# Patient Record
Sex: FEMALE | Race: WHITE | Employment: OTHER | URBAN - METROPOLITAN AREA
[De-identification: names, ages, dates, MRNs, and addresses within clinical notes are randomized per-mention and may not be internally consistent; named-entity substitution may affect disease eponyms.]

---

## 2017-01-02 ENCOUNTER — HOSPITAL ENCOUNTER (OUTPATIENT)
Dept: CT IMAGING | Facility: HOSPITAL | Age: 82
Discharge: HOME | End: 2017-01-02
Attending: INTERNAL MEDICINE
Payer: MEDICARE

## 2017-01-02 DIAGNOSIS — E61.1 IRON DEFICIENCY: ICD-10-CM

## 2017-01-02 DIAGNOSIS — C18.3 MALIGNANT NEOPLASM OF HEPATIC FLEXURE (HCC): ICD-10-CM

## 2017-01-02 DIAGNOSIS — R91.1 PULMONARY NODULE: ICD-10-CM

## 2017-01-02 PROCEDURE — 74177 CT ABD & PELVIS W/CONTRAST: CPT

## 2017-01-02 PROCEDURE — 71260 CT THORAX DX C+: CPT

## 2017-01-23 ENCOUNTER — SURGERY (OUTPATIENT)
Age: 82
End: 2017-01-23

## 2017-01-23 ENCOUNTER — APPOINTMENT (OUTPATIENT)
Dept: GENERAL RADIOLOGY | Facility: HOSPITAL | Age: 82
End: 2017-01-23
Attending: ANESTHESIOLOGY
Payer: MEDICARE

## 2017-01-24 ENCOUNTER — TELEPHONE (OUTPATIENT)
Dept: NEUROLOGY | Facility: CLINIC | Age: 82
End: 2017-01-24

## 2017-01-24 NOTE — TELEPHONE ENCOUNTER
Spoke with pt who had questions in regards to steroid injections side effects. Pt reports face is red since cervical steroid injection yesterday 1/23/2017. No other side effects reported by pt. Questions answered, no further questions at this time.

## 2017-01-30 ENCOUNTER — APPOINTMENT (OUTPATIENT)
Dept: GENERAL RADIOLOGY | Facility: HOSPITAL | Age: 82
End: 2017-01-30
Attending: ANESTHESIOLOGY
Payer: MEDICARE

## 2017-01-30 ENCOUNTER — SURGERY (OUTPATIENT)
Age: 82
End: 2017-01-30

## 2017-02-06 ENCOUNTER — SURGERY (OUTPATIENT)
Age: 82
End: 2017-02-06

## 2017-02-20 PROBLEM — R11.2 NAUSEA & VOMITING: Status: ACTIVE | Noted: 2017-02-20

## 2017-02-20 PROCEDURE — 82378 CARCINOEMBRYONIC ANTIGEN: CPT | Performed by: PHYSICIAN ASSISTANT

## 2017-03-01 ENCOUNTER — HOSPITAL (OUTPATIENT)
Dept: OTHER | Age: 82
End: 2017-03-01
Attending: EMERGENCY MEDICINE

## 2017-03-20 ENCOUNTER — TELEPHONE (OUTPATIENT)
Dept: SURGERY | Facility: CLINIC | Age: 82
End: 2017-03-20

## 2017-03-20 NOTE — TELEPHONE ENCOUNTER
Spoke to patient, advised AB out of office on 3/27, appointment will need to be rescheduled. Patient rescheduled to see Cherre Child APN on Friday 3/31, patient verbalized understanding, no further needs at this time.

## 2017-03-25 ENCOUNTER — HOSPITAL (OUTPATIENT)
Dept: OTHER | Age: 82
End: 2017-03-25
Attending: SURGERY

## 2017-03-26 ENCOUNTER — CHARTING TRANS (OUTPATIENT)
Dept: OTHER | Age: 82
End: 2017-03-26

## 2017-03-31 ENCOUNTER — OFFICE VISIT (OUTPATIENT)
Dept: SURGERY | Facility: CLINIC | Age: 82
End: 2017-03-31

## 2017-03-31 VITALS
SYSTOLIC BLOOD PRESSURE: 110 MMHG | RESPIRATION RATE: 18 BRPM | HEIGHT: 64 IN | BODY MASS INDEX: 26.29 KG/M2 | WEIGHT: 154 LBS | DIASTOLIC BLOOD PRESSURE: 64 MMHG | HEART RATE: 85 BPM

## 2017-03-31 DIAGNOSIS — M25.511 CHRONIC RIGHT SHOULDER PAIN: ICD-10-CM

## 2017-03-31 DIAGNOSIS — M50.30 DEGENERATIVE DISC DISEASE, CERVICAL: ICD-10-CM

## 2017-03-31 DIAGNOSIS — G89.29 CHRONIC RIGHT SHOULDER PAIN: ICD-10-CM

## 2017-03-31 DIAGNOSIS — G56.01 CARPAL TUNNEL SYNDROME ON RIGHT: ICD-10-CM

## 2017-03-31 DIAGNOSIS — M54.81 BILATERAL OCCIPITAL NEURALGIA: Primary | ICD-10-CM

## 2017-03-31 DIAGNOSIS — M47.812 FACET ARTHROPATHY, CERVICAL: ICD-10-CM

## 2017-03-31 PROCEDURE — 99213 OFFICE O/P EST LOW 20 MIN: CPT | Performed by: NURSE PRACTITIONER

## 2017-03-31 NOTE — PATIENT INSTRUCTIONS
Refill policies:    • Allow 2 business days for refills; controlled substances may take longer.   • Contact your pharmacy at least 5 days prior to running out of medication and have them send an electronic request or submit request through the “request re insurance carrier to obtain pre-certification or prior authorization. Unfortunately, [facility] has seen an increase in denial of payment even though the procedure/test has been pre-certified.   You are strongly encouraged to contact your insurance carrier to v

## 2017-03-31 NOTE — PROGRESS NOTES
Name: Aly Schmitt   : 1935   DOS: 3/31/2017     Patient presents with: Other: f/u,  pain in bilateral arms    HPI  Pain Clinic Follow Up Visit:   Aly Schmitt is a 80year old female who presents for recheck of Chronic pain after;    Last Unspecified vitamin D deficiency 5/23/2011   • Lymphedema of arm 5/23/2011   • Cheekbone fracture ([facility] [address] 5/23/2011   • Thyroid nodule 5/23/2011     partial thyroidectomy   • Colon polyp      Dr Jami Murray   • Osteopenia 6/23/2011   • GERD 2011   • Degeneration Oral Tab Take 1 tablet (10 mg total) by mouth once daily. Disp: 90 tablet Rfl: 0   aspirin 81 MG Oral Tab Take 81 mg by mouth daily. Disp:  Rfl:    Cyanocobalamin (VITAMIN B-12 OR) Take  by mouth.  Disp:  Rfl:          EXAM:   /64 mmHg  Pulse 85  Resp and capsulitis. The acromion is type II with narrowing of the subacromial arch measuring approximately 4-5 mm. Associated mild subacromial/subdeltoid bursitis noted.   BICEPS/LABRAL COMPLEX:  The long head of the biceps tendon is intact without significant narrowing of the subacromial arch, also progressed from the prior exam. Please correlate clinically for subacromial impingement.              Dictated by:  Jed Song DO on 11/12/2015 at 9:38        ASSESSMENT and PLAN:   Chronic pain that is resolved to

## 2017-05-25 RX ORDER — MELATONIN
1000
COMMUNITY
End: 2019-01-01

## 2017-05-25 RX ORDER — DOXEPIN HYDROCHLORIDE 50 MG/1
1 CAPSULE ORAL DAILY
COMMUNITY
End: 2017-09-01

## 2017-06-13 ENCOUNTER — HOSPITAL ENCOUNTER (OUTPATIENT)
Facility: HOSPITAL | Age: 82
Setting detail: HOSPITAL OUTPATIENT SURGERY
Discharge: HOME | End: 2017-06-13
Attending: INTERNAL MEDICINE | Admitting: INTERNAL MEDICINE
Payer: MEDICARE

## 2017-06-13 ENCOUNTER — SURGERY (OUTPATIENT)
Age: 82
End: 2017-06-13

## 2017-06-13 VITALS
SYSTOLIC BLOOD PRESSURE: 110 MMHG | DIASTOLIC BLOOD PRESSURE: 60 MMHG | WEIGHT: 150 LBS | HEIGHT: 66 IN | RESPIRATION RATE: 15 BRPM | TEMPERATURE: 98 F | HEART RATE: 79 BPM | OXYGEN SATURATION: 95 % | BODY MASS INDEX: 24.11 KG/M2

## 2017-06-13 DIAGNOSIS — Z12.11 SPECIAL SCREENING FOR MALIGNANT NEOPLASMS, COLON: ICD-10-CM

## 2017-06-13 PROCEDURE — 0DJD8ZZ INSPECTION OF LOWER INTESTINAL TRACT, VIA NATURAL OR ARTIFICIAL OPENING ENDOSCOPIC: ICD-10-PCS | Performed by: INTERNAL MEDICINE

## 2017-06-13 RX ORDER — MIDAZOLAM HYDROCHLORIDE 1 MG/ML
INJECTION INTRAMUSCULAR; INTRAVENOUS
Status: DISCONTINUED | OUTPATIENT
Start: 2017-06-13 | End: 2017-06-13

## 2017-06-13 RX ORDER — SODIUM CHLORIDE, SODIUM LACTATE, POTASSIUM CHLORIDE, CALCIUM CHLORIDE 600; 310; 30; 20 MG/100ML; MG/100ML; MG/100ML; MG/100ML
INJECTION, SOLUTION INTRAVENOUS CONTINUOUS
Status: DISCONTINUED | OUTPATIENT
Start: 2017-06-13 | End: 2017-06-13

## 2017-06-13 NOTE — OPERATIVE REPORT
[facility],4Th Floor Patient Status:  Hospital Outpatient Surgery    1935 MRN [id number]   [facility] ENDOSCOPY Attending Wanda Valdes MD   Hosp Day # 0 PCP Audie Maxwell MD         PATIENT NAME: Octavio Del Valle

## 2017-06-13 NOTE — H&P
[facility] GASTROENTEROLOGY    REFERRING PHYSICIAN: Dr. Noemi Julian is a 80year old female. CRC screening, Hx colon cancer    See note reviewed from 5/5/17    PROCEDURE: Colonoscopy    Allergies: Codeine; Norco; Amoxicillin;  Marta Corti deirdre at [address] [place] [address] GI ENDOSCOPY,BIOPSY  2/18/16= Normal, mild duodenal villous blunting, positive celiac antibodies    COLONOSCOPY,BIOPSY  2/18//16= Diverticulosis, Colon cancer    Comment Repeat 2017    MASTECTOMY

## 2017-09-01 PROCEDURE — 82378 CARCINOEMBRYONIC ANTIGEN: CPT | Performed by: INTERNAL MEDICINE

## 2017-09-01 PROCEDURE — 82607 VITAMIN B-12: CPT | Performed by: INTERNAL MEDICINE

## 2017-10-12 PROBLEM — Z91.81 AT RISK FOR FALLING: Status: ACTIVE | Noted: 2017-10-12

## 2017-10-12 PROBLEM — C77.2 METASTASIS TO MESENTERIC LYMPH NODE (HCC): Status: ACTIVE | Noted: 2017-10-12

## 2017-10-12 PROBLEM — Z71.9 HEALTH EDUCATION/COUNSELING: Status: ACTIVE | Noted: 2017-10-12

## 2017-10-13 ENCOUNTER — TELEPHONE (OUTPATIENT)
Dept: LAB | Age: 82
End: 2017-10-13

## 2017-10-13 NOTE — TELEPHONE ENCOUNTER
Patient called requesting her home health be cancelled as soon as possible stated: \"theres no sense in 2 people coming to do therapy on me one for my hand and the other for my leg, I walk around the block all the time, they are just taking my money\"  I t

## 2017-11-14 ENCOUNTER — DIAGNOSTIC TRANS (OUTPATIENT)
Dept: OTHER | Age: 82
End: 2017-11-14

## 2017-11-14 ENCOUNTER — HOSPITAL (OUTPATIENT)
Dept: OTHER | Age: 82
End: 2017-11-14
Attending: HOSPITALIST

## 2017-11-14 LAB
ANALYZER ANC (IANC): ABNORMAL
ANION GAP SERPL CALC-SCNC: 18 MMOL/L (ref 10–20)
BASOPHILS # BLD: 0 THOUSAND/MCL (ref 0–0.3)
BASOPHILS NFR BLD: 0 %
BUN SERPL-MCNC: 15 MG/DL (ref 6–20)
BUN/CREAT SERPL: 31 (ref 7–25)
CALCIUM SERPL-MCNC: 9 MG/DL (ref 8.4–10.2)
CHLORIDE: 101 MMOL/L (ref 98–107)
CO2 SERPL-SCNC: 22 MMOL/L (ref 21–32)
CREAT SERPL-MCNC: 0.48 MG/DL (ref 0.51–0.95)
DIFFERENTIAL METHOD BLD: ABNORMAL
EOSINOPHIL # BLD: 0 THOUSAND/MCL (ref 0.1–0.5)
EOSINOPHIL NFR BLD: 0 %
ERYTHROCYTE [DISTWIDTH] IN BLOOD: 12.9 % (ref 11–15)
GLUCOSE BLDC GLUCOMTR-MCNC: 167 MG/DL (ref 65–99)
GLUCOSE SERPL-MCNC: 179 MG/DL (ref 65–99)
HEMATOCRIT: 37.9 % (ref 36–46.5)
HGB BLD-MCNC: 12.9 GM/DL (ref 12–15.5)
LACTATE BLDV-MCNC: 2.8 MMOL/L
LACTATE BLDV-MCNC: 4.1 MMOL/L
LACTATE BLDV-SCNC: 1.9 MMOL/L (ref 0–2)
LACTATE BLDV-SCNC: 3 MMOL/L (ref 0–2)
LYMPHOCYTES # BLD: 0.9 THOUSAND/MCL (ref 1–4)
LYMPHOCYTES NFR BLD: 10 %
MCH RBC QN AUTO: 31.1 PG (ref 26–34)
MCHC RBC AUTO-ENTMCNC: 34 GM/DL (ref 32–36.5)
MCV RBC AUTO: 91.3 FL (ref 78–100)
MONOCYTES # BLD: 0.4 THOUSAND/MCL (ref 0.3–0.9)
MONOCYTES NFR BLD: 4 %
NEUTROPHILS # BLD: 7.4 THOUSAND/MCL (ref 1.8–7.7)
NEUTROPHILS NFR BLD: 86 %
NEUTS SEG NFR BLD: ABNORMAL %
PERCENT NRBC: ABNORMAL
PLATELET # BLD: 210 THOUSAND/MCL (ref 140–450)
POTASSIUM SERPL-SCNC: 3.8 MMOL/L (ref 3.4–5.1)
PROCALCITONIN SERPL IA-MCNC: <0.05 NG/ML
PROLACTIN SERPL-MCNC: 46.2 NG/ML (ref 2.8–29.2)
RBC # BLD: 4.15 MILLION/MCL (ref 4–5.2)
SODIUM SERPL-SCNC: 137 MMOL/L (ref 135–145)
WBC # BLD: 8.7 THOUSAND/MCL (ref 4.2–11)

## 2017-11-15 LAB
CK SERPL-CCNC: 150 UNIT/L (ref 26–192)
LACTATE BLDV-SCNC: 1.7 MMOL/L (ref 0–2)
LACTATE BLDV-SCNC: 2 MMOL/L (ref 0–2)
LACTATE BLDV-SCNC: 2.2 MMOL/L (ref 0–2)

## 2017-11-24 PROBLEM — R42 VERTIGO: Status: ACTIVE | Noted: 2017-11-24

## 2017-11-24 PROBLEM — Z87.898 HISTORY OF SEIZURE: Status: ACTIVE | Noted: 2017-11-24

## 2017-11-27 PROBLEM — R41.3 MEMORY IMPAIRMENT: Status: ACTIVE | Noted: 2017-11-27

## 2017-11-29 ENCOUNTER — DIAGNOSTIC TRANS (OUTPATIENT)
Dept: OTHER | Age: 82
End: 2017-11-29

## 2017-11-29 ENCOUNTER — CHARTING TRANS (OUTPATIENT)
Dept: OTHER | Age: 82
End: 2017-11-29

## 2017-11-29 ENCOUNTER — HOSPITAL (OUTPATIENT)
Dept: OTHER | Age: 82
End: 2017-11-29
Attending: HOSPITALIST

## 2017-11-29 PROBLEM — R07.89 CHEST PRESSURE: Status: ACTIVE | Noted: 2017-11-29

## 2017-11-29 PROBLEM — R06.02 SHORTNESS OF BREATH: Status: ACTIVE | Noted: 2017-11-29

## 2017-11-29 LAB
ALBUMIN SERPL-MCNC: 3.5 GM/DL (ref 3.6–5.1)
ALBUMIN/GLOB SERPL: 1.1 {RATIO} (ref 1–2.4)
ALP SERPL-CCNC: 67 UNIT/L (ref 45–117)
ALT SERPL-CCNC: 79 UNIT/L
ANALYZER ANC (IANC): ABNORMAL
ANION GAP SERPL CALC-SCNC: 11 MMOL/L (ref 10–20)
AST SERPL-CCNC: 48 UNIT/L
BASOPHILS # BLD: 0 THOUSAND/MCL (ref 0–0.3)
BASOPHILS NFR BLD: 0 %
BILIRUB SERPL-MCNC: 0.5 MG/DL (ref 0.2–1)
BUN SERPL-MCNC: 15 MG/DL (ref 6–20)
BUN/CREAT SERPL: 31 (ref 7–25)
CALCIUM SERPL-MCNC: 8.3 MG/DL (ref 8.4–10.2)
CHLORIDE: 101 MMOL/L (ref 98–107)
CO2 SERPL-SCNC: 26 MMOL/L (ref 21–32)
CREAT SERPL-MCNC: 0.48 MG/DL (ref 0.51–0.95)
DIFFERENTIAL METHOD BLD: ABNORMAL
EOSINOPHIL # BLD: 0 THOUSAND/MCL (ref 0.1–0.5)
EOSINOPHIL NFR BLD: 0 %
ERYTHROCYTE [DISTWIDTH] IN BLOOD: 14 % (ref 11–15)
GLOBULIN SER-MCNC: 3.2 GM/DL (ref 2–4)
GLUCOSE SERPL-MCNC: 139 MG/DL (ref 65–99)
HEMATOCRIT: 35.1 % (ref 36–46.5)
HGB BLD-MCNC: 12.2 GM/DL (ref 12–15.5)
LYMPHOCYTES # BLD: 0.8 THOUSAND/MCL (ref 1–4)
LYMPHOCYTES NFR BLD: 7 %
MCH RBC QN AUTO: 31.5 PG (ref 26–34)
MCHC RBC AUTO-ENTMCNC: 34.8 GM/DL (ref 32–36.5)
MCV RBC AUTO: 90.7 FL (ref 78–100)
MONOCYTES # BLD: 0.8 THOUSAND/MCL (ref 0.3–0.9)
MONOCYTES NFR BLD: 8 %
NEUTROPHILS # BLD: 8.7 THOUSAND/MCL (ref 1.8–7.7)
NEUTROPHILS NFR BLD: 85 %
NEUTS SEG NFR BLD: ABNORMAL %
PERCENT NRBC: ABNORMAL
PLATELET # BLD: 194 THOUSAND/MCL (ref 140–450)
POTASSIUM SERPL-SCNC: 4.1 MMOL/L (ref 3.4–5.1)
PROT SERPL-MCNC: 6.7 GM/DL (ref 6.4–8.2)
RBC # BLD: 3.87 MILLION/MCL (ref 4–5.2)
SODIUM SERPL-SCNC: 134 MMOL/L (ref 135–145)
TROPONIN I SERPL HS-MCNC: <0.02 NG/ML
TROPONIN I SERPL HS-MCNC: <0.02 NG/ML
WBC # BLD: 10.3 THOUSAND/MCL (ref 4.2–11)

## 2017-11-30 PROBLEM — T50.905A ADVERSE DRUG EFFECT: Status: ACTIVE | Noted: 2017-11-30

## 2017-11-30 LAB
ALBUMIN SERPL-MCNC: 3.2 GM/DL (ref 3.6–5.1)
ALP SERPL-CCNC: 61 UNIT/L (ref 45–117)
ALT SERPL-CCNC: 71 UNIT/L
ANALYZER ANC (IANC): ABNORMAL
ANION GAP SERPL CALC-SCNC: 13 MMOL/L (ref 10–20)
AST SERPL-CCNC: 36 UNIT/L
BASOPHILS # BLD: 0 THOUSAND/MCL (ref 0–0.3)
BASOPHILS NFR BLD: 0 %
BILIRUB CONJ SERPL-MCNC: 0.1 MG/DL (ref 0–0.2)
BILIRUB SERPL-MCNC: 0.7 MG/DL (ref 0.2–1)
BUN SERPL-MCNC: 16 MG/DL (ref 6–20)
BUN/CREAT SERPL: 33 (ref 7–25)
CALCIUM SERPL-MCNC: 8.7 MG/DL (ref 8.4–10.2)
CHLORIDE: 105 MMOL/L (ref 98–107)
CO2 SERPL-SCNC: 23 MMOL/L (ref 21–32)
CREAT SERPL-MCNC: 0.49 MG/DL (ref 0.51–0.95)
DIFFERENTIAL METHOD BLD: ABNORMAL
EOSINOPHIL # BLD: 0 THOUSAND/MCL (ref 0.1–0.5)
EOSINOPHIL NFR BLD: 0 %
ERYTHROCYTE [DISTWIDTH] IN BLOOD: 14.1 % (ref 11–15)
GLUCOSE SERPL-MCNC: 131 MG/DL (ref 65–99)
HEMATOCRIT: 36.3 % (ref 36–46.5)
HGB BLD-MCNC: 12.4 GM/DL (ref 12–15.5)
LYMPHOCYTES # BLD: 1 THOUSAND/MCL (ref 1–4)
LYMPHOCYTES NFR BLD: 14 %
MCH RBC QN AUTO: 31 PG (ref 26–34)
MCHC RBC AUTO-ENTMCNC: 34.2 GM/DL (ref 32–36.5)
MCV RBC AUTO: 90.8 FL (ref 78–100)
MONOCYTES # BLD: 0.6 THOUSAND/MCL (ref 0.3–0.9)
MONOCYTES NFR BLD: 9 %
NEUTROPHILS # BLD: 5.4 THOUSAND/MCL (ref 1.8–7.7)
NEUTROPHILS NFR BLD: 77 %
NEUTS SEG NFR BLD: ABNORMAL %
PERCENT NRBC: ABNORMAL
PLATELET # BLD: 178 THOUSAND/MCL (ref 140–450)
POTASSIUM SERPL-SCNC: 4.3 MMOL/L (ref 3.4–5.1)
PROT SERPL-MCNC: 6.2 GM/DL (ref 6.4–8.2)
RBC # BLD: 4 MILLION/MCL (ref 4–5.2)
SODIUM SERPL-SCNC: 137 MMOL/L (ref 135–145)
TROPONIN I SERPL HS-MCNC: <0.02 NG/ML
TSH SERPL-ACNC: 0.44 MCUNIT/ML (ref 0.35–5)
WBC # BLD: 7 THOUSAND/MCL (ref 4.2–11)

## 2017-12-01 LAB
ANION GAP SERPL CALC-SCNC: 11 MMOL/L (ref 10–20)
BUN SERPL-MCNC: 13 MG/DL (ref 6–20)
BUN/CREAT SERPL: 23 (ref 7–25)
CALCIUM SERPL-MCNC: 8.3 MG/DL (ref 8.4–10.2)
CHLORIDE: 106 MMOL/L (ref 98–107)
CO2 SERPL-SCNC: 27 MMOL/L (ref 21–32)
CREAT SERPL-MCNC: 0.57 MG/DL (ref 0.51–0.95)
GLUCOSE SERPL-MCNC: 86 MG/DL (ref 65–99)
POTASSIUM SERPL-SCNC: 4 MMOL/L (ref 3.4–5.1)
SODIUM SERPL-SCNC: 140 MMOL/L (ref 135–145)

## 2017-12-09 PROBLEM — R53.1 GENERAL WEAKNESS: Status: ACTIVE | Noted: 2017-12-09

## 2017-12-09 PROBLEM — R53.83 OTHER FATIGUE: Status: ACTIVE | Noted: 2017-12-09

## 2017-12-18 PROBLEM — T45.1X5A MOUTH SORE SECONDARY TO CHEMOTHERAPY: Status: ACTIVE | Noted: 2017-12-18

## 2017-12-18 PROBLEM — K13.79 MOUTH SORE SECONDARY TO CHEMOTHERAPY: Status: ACTIVE | Noted: 2017-12-18

## 2017-12-22 PROBLEM — F32.9 REACTIVE DEPRESSION: Status: ACTIVE | Noted: 2017-12-22

## 2017-12-22 PROBLEM — B00.2 ORAL HERPES SIMPLEX INFECTION: Status: ACTIVE | Noted: 2017-12-22

## 2017-12-29 LAB
ANALYZER ANC (IANC): ABNORMAL
ANION GAP SERPL CALC-SCNC: 15 MMOL/L (ref 10–20)
APTT PPP: 25 SECONDS (ref 22–30)
APTT PPP: NORMAL S
BASOPHILS # BLD: 0 THOUSAND/MCL (ref 0–0.3)
BASOPHILS NFR BLD: 0 %
BNP SERPL-MCNC: 11 PG/ML
BUN SERPL-MCNC: 12 MG/DL (ref 6–20)
BUN/CREAT SERPL: 16 (ref 7–25)
CALCIUM SERPL-MCNC: 9.7 MG/DL (ref 8.4–10.2)
CHLORIDE: 107 MMOL/L (ref 98–107)
CO2 SERPL-SCNC: 27 MMOL/L (ref 21–32)
CREAT SERPL-MCNC: 0.75 MG/DL (ref 0.51–0.95)
DIFFERENTIAL METHOD BLD: ABNORMAL
EOSINOPHIL # BLD: 0.2 THOUSAND/MCL (ref 0.1–0.5)
EOSINOPHIL NFR BLD: 2 %
ERYTHROCYTE [DISTWIDTH] IN BLOOD: 17 % (ref 11–15)
GLUCOSE SERPL-MCNC: 98 MG/DL (ref 65–99)
HEMATOCRIT: 33.9 % (ref 36–46.5)
HGB BLD-MCNC: 11.4 GM/DL (ref 12–15.5)
INR PPP: 1
LYMPHOCYTES # BLD: 1.6 THOUSAND/MCL (ref 1–4)
LYMPHOCYTES NFR BLD: 21 %
MAGNESIUM SERPL-MCNC: 2 MG/DL (ref 1.7–2.4)
MCH RBC QN AUTO: 32.1 PG (ref 26–34)
MCHC RBC AUTO-ENTMCNC: 33.6 GM/DL (ref 32–36.5)
MCV RBC AUTO: 95.5 FL (ref 78–100)
MONOCYTES # BLD: 0.8 THOUSAND/MCL (ref 0.3–0.9)
MONOCYTES NFR BLD: 10 %
NEUTROPHILS # BLD: 5.2 THOUSAND/MCL (ref 1.8–7.7)
NEUTS BAND NFR BLD: 2 % (ref 0–10)
NEUTS SEG NFR BLD: 65 %
PATH REV BLD -IMP: ABNORMAL
PLAT MORPH BLD: NORMAL
PLATELET # BLD: 255 THOUSAND/MCL (ref 140–450)
POTASSIUM SERPL-SCNC: 4.1 MMOL/L (ref 3.4–5.1)
PROTHROMBIN TIME: 10.1 SECONDS (ref 9.7–11.8)
PROTHROMBIN TIME: NORMAL
RBC # BLD: 3.55 MILLION/MCL (ref 4–5.2)
RBC MORPH BLD: NORMAL
SODIUM SERPL-SCNC: 145 MMOL/L (ref 135–145)
TROPONIN I SERPL HS-MCNC: <0.02 NG/ML
WBC # BLD: 7.7 THOUSAND/MCL (ref 4.2–11)
WBC MORPH BLD: NORMAL

## 2017-12-30 ENCOUNTER — DIAGNOSTIC TRANS (OUTPATIENT)
Dept: OTHER | Age: 82
End: 2017-12-30

## 2017-12-30 ENCOUNTER — HOSPITAL (OUTPATIENT)
Dept: OTHER | Age: 82
End: 2017-12-30
Attending: HOSPITALIST

## 2017-12-30 ENCOUNTER — CHARTING TRANS (OUTPATIENT)
Dept: OTHER | Age: 82
End: 2017-12-30

## 2017-12-30 LAB
2009 H1N1 SUBTYPE (RF1N1): NOT DETECTED
ADENOVIRUS (RADENO): NOT DETECTED
ANALYZER ANC (IANC): ABNORMAL
ANION GAP SERPL CALC-SCNC: 15 MMOL/L (ref 10–20)
BASOPHILS # BLD: 0.1 THOUSAND/MCL (ref 0–0.3)
BASOPHILS NFR BLD: 1 %
BOCAVIRUS (RBOCA): NOT DETECTED
BUN SERPL-MCNC: 9 MG/DL (ref 6–20)
BUN/CREAT SERPL: 15 (ref 7–25)
C. PNEUMONIAE (RCHLP): NOT DETECTED
CALCIUM SERPL-MCNC: 8.7 MG/DL (ref 8.4–10.2)
CHLORIDE: 109 MMOL/L (ref 98–107)
CO2 SERPL-SCNC: 24 MMOL/L (ref 21–32)
CORONAVIRUS 229E (RC229E): NOT DETECTED
CORONAVIRUS HKU1 (RCHKU1): NOT DETECTED
CORONAVIRUS NL63 (RCNL63): NOT DETECTED
CORONAVIRUS OC43 (RCO43): NOT DETECTED
CREAT SERPL-MCNC: 0.6 MG/DL (ref 0.51–0.95)
DIFFERENTIAL METHOD BLD: ABNORMAL
EOSINOPHIL # BLD: 0.2 THOUSAND/MCL (ref 0.1–0.5)
EOSINOPHIL NFR BLD: 2 %
ERYTHROCYTE [DISTWIDTH] IN BLOOD: 16.8 % (ref 11–15)
GLUCOSE SERPL-MCNC: 91 MG/DL (ref 65–99)
HEMATOCRIT: 32 % (ref 36–46.5)
HGB BLD-MCNC: 10.8 GM/DL (ref 12–15.5)
INFLUENZA A SUBTYPE H1 (RFLH1): NOT DETECTED
INFLUENZA A SUBTYPE H3 (RFLH3): NOT DETECTED
INFLUENZA A UNSUBTYPABLE (RIAU): NOT DETECTED
INFLUENZA B VIRUS (XFLUB): NOT DETECTED
LACTATE BLDV-MCNC: 1.9 MMOL/L
LACTATE BLDV-MCNC: 2 MMOL/L
LYMPHOCYTES # BLD: 2.2 THOUSAND/MCL (ref 1–4)
LYMPHOCYTES NFR BLD: 31 %
M. PNEUMONIAE (RMYPP): NOT DETECTED
MCH RBC QN AUTO: 31.8 PG (ref 26–34)
MCHC RBC AUTO-ENTMCNC: 33.8 GM/DL (ref 32–36.5)
MCV RBC AUTO: 94.1 FL (ref 78–100)
METAPNEUMOVIRUS (RMETA): NOT DETECTED
MONOCYTES # BLD: 1.2 THOUSAND/MCL (ref 0.3–0.9)
MONOCYTES NFR BLD: 17 %
NEUTROPHILS # BLD: 3.5 THOUSAND/MCL (ref 1.8–7.7)
NEUTROPHILS NFR BLD: 49 %
NEUTS SEG NFR BLD: ABNORMAL %
PARAINFLUENZA, TYPE 1 (RPAR1): NOT DETECTED
PARAINFLUENZA, TYPE 2 (RPAR2): NOT DETECTED
PARAINFLUENZA, TYPE 3 (RPAR3): NOT DETECTED
PARAINFLUENZA, TYPE 4 (RPAR4): NOT DETECTED
PERCENT NRBC: ABNORMAL
PLATELET # BLD: 229 THOUSAND/MCL (ref 140–450)
POTASSIUM SERPL-SCNC: 3.7 MMOL/L (ref 3.4–5.1)
PROCALCITONIN SERPL IA-MCNC: <0.05 NG/ML
RBC # BLD: 3.4 MILLION/MCL (ref 4–5.2)
RHINOVIRUS/ENTEROVIRUS (RRHINO): NOT DETECTED
RSV, SUBTYPE A (RRSVA): NOT DETECTED
RSV, SUBTYPE B (RRSVB): NOT DETECTED
SODIUM SERPL-SCNC: 144 MMOL/L (ref 135–145)
SPECIMEN SOURCE: NORMAL
WBC # BLD: 7 THOUSAND/MCL (ref 4.2–11)

## 2017-12-31 LAB
ANION GAP SERPL CALC-SCNC: 11 MMOL/L (ref 10–20)
BUN SERPL-MCNC: 8 MG/DL (ref 6–20)
BUN/CREAT SERPL: 14 (ref 7–25)
CALCIUM SERPL-MCNC: 8.6 MG/DL (ref 8.4–10.2)
CHLORIDE: 111 MMOL/L (ref 98–107)
CO2 SERPL-SCNC: 26 MMOL/L (ref 21–32)
CREAT SERPL-MCNC: 0.56 MG/DL (ref 0.51–0.95)
GLUCOSE SERPL-MCNC: 85 MG/DL (ref 65–99)
POTASSIUM SERPL-SCNC: 3.8 MMOL/L (ref 3.4–5.1)
SODIUM SERPL-SCNC: 144 MMOL/L (ref 135–145)

## 2018-01-01 LAB
ANION GAP SERPL CALC-SCNC: 12 MMOL/L (ref 10–20)
BUN SERPL-MCNC: 8 MG/DL (ref 6–20)
BUN/CREAT SERPL: 16 (ref 7–25)
CALCIUM SERPL-MCNC: 8.6 MG/DL (ref 8.4–10.2)
CHLORIDE: 109 MMOL/L (ref 98–107)
CO2 SERPL-SCNC: 25 MMOL/L (ref 21–32)
CREAT SERPL-MCNC: 0.51 MG/DL (ref 0.51–0.95)
GLUCOSE SERPL-MCNC: 95 MG/DL (ref 65–99)
POTASSIUM SERPL-SCNC: 3.5 MMOL/L (ref 3.4–5.1)
SODIUM SERPL-SCNC: 142 MMOL/L (ref 135–145)

## 2018-01-02 LAB
CREAT SERPL-MCNC: 0.51 MG/DL (ref 0.51–0.95)
POTASSIUM SERPL-SCNC: 3.6 MMOL/L (ref 3.4–5.1)

## 2018-01-06 PROBLEM — J18.9 PNEUMONIA: Status: ACTIVE | Noted: 2018-01-06

## 2018-01-15 PROBLEM — K13.79 MOUTH SORE SECONDARY TO CHEMOTHERAPY: Status: RESOLVED | Noted: 2017-12-18 | Resolved: 2018-01-15

## 2018-01-15 PROBLEM — T45.1X5A MOUTH SORE SECONDARY TO CHEMOTHERAPY: Status: RESOLVED | Noted: 2017-12-18 | Resolved: 2018-01-15

## 2018-01-22 PROCEDURE — 83970 ASSAY OF PARATHORMONE: CPT | Performed by: INTERNAL MEDICINE

## 2018-01-26 PROBLEM — R19.7 DIARRHEA: Status: ACTIVE | Noted: 2018-01-26

## 2018-01-26 PROBLEM — M79.89 LEFT ARM SWELLING: Status: ACTIVE | Noted: 2018-01-26

## 2018-01-29 PROCEDURE — 87493 C DIFF AMPLIFIED PROBE: CPT | Performed by: PHYSICIAN ASSISTANT

## 2018-02-02 PROBLEM — S09.90XA HEAD INJURY, ACUTE: Status: ACTIVE | Noted: 2018-02-02

## 2018-02-02 PROBLEM — M25.559 HIP PAIN: Status: ACTIVE | Noted: 2018-02-02

## 2018-02-02 PROBLEM — A04.72 CLOSTRIDIUM DIFFICILE DIARRHEA: Status: ACTIVE | Noted: 2018-02-02

## 2018-02-12 PROBLEM — R60.0 LEG EDEMA, LEFT: Status: ACTIVE | Noted: 2018-02-12

## 2018-02-13 PROBLEM — E87.6 HYPOKALEMIA: Status: ACTIVE | Noted: 2018-02-13

## 2018-02-23 PROBLEM — R05.9 COUGH: Status: ACTIVE | Noted: 2018-02-23

## 2018-04-06 PROBLEM — Z71.9 HEALTH EDUCATION/COUNSELING: Status: ACTIVE | Noted: 2018-04-06

## 2018-04-06 PROBLEM — G89.29 CHRONIC RIGHT SHOULDER PAIN: Status: ACTIVE | Noted: 2018-04-06

## 2018-04-06 PROBLEM — T45.1X5A CHEMOTHERAPY-INDUCED NEUROPATHY (HCC): Status: ACTIVE | Noted: 2018-04-06

## 2018-04-06 PROBLEM — M25.511 CHRONIC RIGHT SHOULDER PAIN: Status: ACTIVE | Noted: 2018-04-06

## 2018-04-06 PROBLEM — G62.0 CHEMOTHERAPY-INDUCED NEUROPATHY (HCC): Status: ACTIVE | Noted: 2018-04-06

## 2018-05-18 PROBLEM — G89.29 CHRONIC RIGHT SHOULDER PAIN: Status: RESOLVED | Noted: 2018-04-06 | Resolved: 2018-05-18

## 2018-05-18 PROBLEM — M25.512 CHRONIC LEFT SHOULDER PAIN: Status: ACTIVE | Noted: 2018-05-18

## 2018-05-18 PROBLEM — M25.511 CHRONIC RIGHT SHOULDER PAIN: Status: RESOLVED | Noted: 2018-04-06 | Resolved: 2018-05-18

## 2018-05-18 PROBLEM — G89.29 CHRONIC LEFT SHOULDER PAIN: Status: ACTIVE | Noted: 2018-05-18

## 2018-06-01 PROCEDURE — 82378 CARCINOEMBRYONIC ANTIGEN: CPT | Performed by: PHYSICIAN ASSISTANT

## 2018-06-15 PROBLEM — R53.83 OTHER FATIGUE: Status: ACTIVE | Noted: 2018-06-15

## 2018-07-02 PROBLEM — D64.9 ANEMIA: Status: ACTIVE | Noted: 2018-07-02

## 2018-07-31 PROBLEM — Z95.828 PORT-A-CATH IN PLACE: Status: ACTIVE | Noted: 2018-07-31

## 2018-07-31 PROBLEM — R13.10 DIFFICULTY SWALLOWING LIQUIDS: Status: ACTIVE | Noted: 2018-07-31

## 2018-09-21 ENCOUNTER — HOSPITAL ENCOUNTER (OUTPATIENT)
Dept: NUCLEAR MEDICINE | Facility: HOSPITAL | Age: 83
Discharge: HOME | End: 2018-09-21
Attending: PHYSICIAN ASSISTANT
Payer: MEDICARE

## 2018-09-21 DIAGNOSIS — C18.3 MALIGNANT NEOPLASM OF HEPATIC FLEXURE (HCC): ICD-10-CM

## 2018-09-21 DIAGNOSIS — C77.2 METASTASIS TO MESENTERIC LYMPH NODE (HCC): ICD-10-CM

## 2018-09-21 LAB — GLUCOSE BLD-MCNC: 103 MG/DL (ref 65–99)

## 2018-09-21 PROCEDURE — 82962 GLUCOSE BLOOD TEST: CPT

## 2018-09-21 PROCEDURE — 78815 PET IMAGE W/CT SKULL-THIGH: CPT | Performed by: PHYSICIAN ASSISTANT

## 2019-03-01 ENCOUNTER — APPOINTMENT (OUTPATIENT)
Dept: GENERAL RADIOLOGY | Age: 84
End: 2019-03-01
Attending: EMERGENCY MEDICINE
Payer: MEDICARE

## 2019-03-01 ENCOUNTER — HOSPITAL ENCOUNTER (OUTPATIENT)
Facility: HOSPITAL | Age: 84
Setting detail: OBSERVATION
Discharge: HOSPICE | End: 2019-03-03
Attending: EMERGENCY MEDICINE | Admitting: HOSPITALIST
Payer: MEDICARE

## 2019-03-01 DIAGNOSIS — J98.01 ACUTE BRONCHOSPASM: Primary | ICD-10-CM

## 2019-03-01 LAB
ANION GAP SERPL CALC-SCNC: 9 MMOL/L (ref 0–18)
BASOPHILS # BLD AUTO: 0.12 X10(3) UL (ref 0–0.2)
BASOPHILS NFR BLD AUTO: 1 %
BUN BLD-MCNC: 9 MG/DL (ref 7–18)
BUN/CREAT SERPL: 16.4 (ref 10–20)
CALCIUM BLD-MCNC: 9 MG/DL (ref 8.5–10.1)
CHLORIDE SERPL-SCNC: 97 MMOL/L (ref 98–107)
CO2 SERPL-SCNC: 23 MMOL/L (ref 21–32)
CREAT BLD-MCNC: 0.55 MG/DL (ref 0.55–1.02)
DEPRECATED RDW RBC AUTO: 42 FL (ref 35.1–46.3)
EOSINOPHIL # BLD AUTO: 0.8 X10(3) UL (ref 0–0.7)
EOSINOPHIL NFR BLD AUTO: 6.8 %
ERYTHROCYTE [DISTWIDTH] IN BLOOD BY AUTOMATED COUNT: 12.8 % (ref 11–15)
GLUCOSE BLD-MCNC: 102 MG/DL (ref 70–99)
HCT VFR BLD AUTO: 35.1 % (ref 35–48)
HGB BLD-MCNC: 12.1 G/DL (ref 12–16)
IMM GRANULOCYTES # BLD AUTO: 0.05 X10(3) UL (ref 0–1)
IMM GRANULOCYTES NFR BLD: 0.4 %
LACTATE SERPL-SCNC: 1.1 MMOL/L (ref 0.4–2)
LYMPHOCYTES # BLD AUTO: 1.56 X10(3) UL (ref 1–4)
LYMPHOCYTES NFR BLD AUTO: 13.2 %
MCH RBC QN AUTO: 30.8 PG (ref 26–34)
MCHC RBC AUTO-ENTMCNC: 34.5 G/DL (ref 31–37)
MCV RBC AUTO: 89.3 FL (ref 80–100)
MONOCYTES # BLD AUTO: 0.81 X10(3) UL (ref 0.1–1)
MONOCYTES NFR BLD AUTO: 6.9 %
NEUTROPHILS # BLD AUTO: 8.45 X10 (3) UL (ref 1.5–7.7)
NEUTROPHILS # BLD AUTO: 8.45 X10(3) UL (ref 1.5–7.7)
NEUTROPHILS NFR BLD AUTO: 71.7 %
OSMOLALITY SERPL CALC.SUM OF ELEC: 267 MOSM/KG (ref 275–295)
PLATELET # BLD AUTO: 281 10(3)UL (ref 150–450)
POTASSIUM SERPL-SCNC: 4.4 MMOL/L (ref 3.5–5.1)
RBC # BLD AUTO: 3.93 X10(6)UL (ref 3.8–5.3)
SODIUM SERPL-SCNC: 129 MMOL/L (ref 136–145)
WBC # BLD AUTO: 11.8 X10(3) UL (ref 4–11)

## 2019-03-01 PROCEDURE — 99220 INITIAL OBSERVATION CARE,LEVL III: CPT | Performed by: HOSPITALIST

## 2019-03-01 PROCEDURE — 71045 X-RAY EXAM CHEST 1 VIEW: CPT | Performed by: EMERGENCY MEDICINE

## 2019-03-01 RX ORDER — MONTELUKAST SODIUM 10 MG/1
10 TABLET ORAL
Status: DISCONTINUED | OUTPATIENT
Start: 2019-03-02 | End: 2019-03-03

## 2019-03-01 RX ORDER — ONDANSETRON 2 MG/ML
4 INJECTION INTRAMUSCULAR; INTRAVENOUS EVERY 6 HOURS PRN
Status: DISCONTINUED | OUTPATIENT
Start: 2019-03-01 | End: 2019-03-03

## 2019-03-01 RX ORDER — LEVOFLOXACIN 5 MG/ML
750 INJECTION, SOLUTION INTRAVENOUS ONCE
Status: COMPLETED | OUTPATIENT
Start: 2019-03-01 | End: 2019-03-02

## 2019-03-01 RX ORDER — LEVOTHYROXINE SODIUM 0.07 MG/1
75 TABLET ORAL
Status: DISCONTINUED | OUTPATIENT
Start: 2019-03-02 | End: 2019-03-03

## 2019-03-01 RX ORDER — MIRTAZAPINE 15 MG/1
15 TABLET, FILM COATED ORAL NIGHTLY
Status: DISCONTINUED | OUTPATIENT
Start: 2019-03-01 | End: 2019-03-03

## 2019-03-01 RX ORDER — BENZONATATE 100 MG/1
100 CAPSULE ORAL 3 TIMES DAILY PRN
Status: DISCONTINUED | OUTPATIENT
Start: 2019-03-01 | End: 2019-03-03

## 2019-03-01 RX ORDER — METHYLPREDNISOLONE SODIUM SUCCINATE 125 MG/2ML
125 INJECTION, POWDER, LYOPHILIZED, FOR SOLUTION INTRAMUSCULAR; INTRAVENOUS ONCE
Status: COMPLETED | OUTPATIENT
Start: 2019-03-01 | End: 2019-03-01

## 2019-03-01 RX ORDER — CIPROFLOXACIN 250 MG/1
TABLET, FILM COATED ORAL 2 TIMES DAILY
Status: ON HOLD | COMMUNITY
End: 2019-03-03

## 2019-03-01 RX ORDER — SODIUM CHLORIDE 9 MG/ML
INJECTION, SOLUTION INTRAVENOUS CONTINUOUS
Status: DISCONTINUED | OUTPATIENT
Start: 2019-03-01 | End: 2019-03-02

## 2019-03-01 RX ORDER — FLUTICASONE PROPIONATE 50 MCG
1 SPRAY, SUSPENSION (ML) NASAL DAILY
Status: DISCONTINUED | OUTPATIENT
Start: 2019-03-02 | End: 2019-03-03

## 2019-03-01 RX ORDER — ACETAMINOPHEN 325 MG/1
650 TABLET ORAL EVERY 6 HOURS PRN
Status: DISCONTINUED | OUTPATIENT
Start: 2019-03-01 | End: 2019-03-03

## 2019-03-01 RX ORDER — LEVETIRACETAM 500 MG/1
500 TABLET ORAL 2 TIMES DAILY
Status: DISCONTINUED | OUTPATIENT
Start: 2019-03-01 | End: 2019-03-03

## 2019-03-01 RX ORDER — BENZONATATE 100 MG/1
100 CAPSULE ORAL 3 TIMES DAILY PRN
COMMUNITY

## 2019-03-01 RX ORDER — ENOXAPARIN SODIUM 100 MG/ML
40 INJECTION SUBCUTANEOUS DAILY
Status: DISCONTINUED | OUTPATIENT
Start: 2019-03-02 | End: 2019-03-03

## 2019-03-01 RX ORDER — ATORVASTATIN CALCIUM 10 MG/1
10 TABLET, FILM COATED ORAL NIGHTLY
Status: DISCONTINUED | OUTPATIENT
Start: 2019-03-01 | End: 2019-03-03

## 2019-03-01 RX ORDER — FAMOTIDINE 20 MG/1
20 TABLET ORAL 2 TIMES DAILY
Status: DISCONTINUED | OUTPATIENT
Start: 2019-03-01 | End: 2019-03-03

## 2019-03-02 PROBLEM — Z51.5 HOSPICE CARE: Status: ACTIVE | Noted: 2018-04-06

## 2019-03-02 LAB
ADENOVIRUS PCR:: NEGATIVE
ANION GAP SERPL CALC-SCNC: 8 MMOL/L (ref 0–18)
B PERT DNA SPEC QL NAA+PROBE: NEGATIVE
BASOPHILS # BLD AUTO: 0.03 X10(3) UL (ref 0–0.2)
BASOPHILS NFR BLD AUTO: 0.4 %
BUN BLD-MCNC: 5 MG/DL (ref 7–18)
BUN/CREAT SERPL: 9.3 (ref 10–20)
C PNEUM DNA SPEC QL NAA+PROBE: NEGATIVE
CALCIUM BLD-MCNC: 9.1 MG/DL (ref 8.5–10.1)
CHLORIDE SERPL-SCNC: 106 MMOL/L (ref 98–107)
CO2 SERPL-SCNC: 22 MMOL/L (ref 21–32)
CORONAVIRUS 229E PCR:: NEGATIVE
CORONAVIRUS HKU1 PCR:: NEGATIVE
CORONAVIRUS NL63 PCR:: NEGATIVE
CORONAVIRUS OC43 PCR:: NEGATIVE
CREAT BLD-MCNC: 0.54 MG/DL (ref 0.55–1.02)
DEPRECATED RDW RBC AUTO: 41.5 FL (ref 35.1–46.3)
EOSINOPHIL # BLD AUTO: 0 X10(3) UL (ref 0–0.7)
EOSINOPHIL NFR BLD AUTO: 0 %
ERYTHROCYTE [DISTWIDTH] IN BLOOD BY AUTOMATED COUNT: 12.5 % (ref 11–15)
FLUAV RNA SPEC QL NAA+PROBE: NEGATIVE
FLUBV RNA SPEC QL NAA+PROBE: NEGATIVE
GLUCOSE BLD-MCNC: 157 MG/DL (ref 70–99)
HCT VFR BLD AUTO: 35.5 % (ref 35–48)
HGB BLD-MCNC: 12.4 G/DL (ref 12–16)
IMM GRANULOCYTES # BLD AUTO: 0.04 X10(3) UL (ref 0–1)
IMM GRANULOCYTES NFR BLD: 0.5 %
LYMPHOCYTES # BLD AUTO: 0.46 X10(3) UL (ref 1–4)
LYMPHOCYTES NFR BLD AUTO: 6 %
MCH RBC QN AUTO: 31.6 PG (ref 26–34)
MCHC RBC AUTO-ENTMCNC: 34.9 G/DL (ref 31–37)
MCV RBC AUTO: 90.6 FL (ref 80–100)
METAPNEUMOVIRUS PCR:: NEGATIVE
MONOCYTES # BLD AUTO: 0.07 X10(3) UL (ref 0.1–1)
MONOCYTES NFR BLD AUTO: 0.9 %
MYCOPLASMA PNEUMONIA PCR:: NEGATIVE
NEUTROPHILS # BLD AUTO: 7.07 X10 (3) UL (ref 1.5–7.7)
NEUTROPHILS # BLD AUTO: 7.07 X10(3) UL (ref 1.5–7.7)
NEUTROPHILS NFR BLD AUTO: 92.2 %
OSMOLALITY SERPL CALC.SUM OF ELEC: 283 MOSM/KG (ref 275–295)
PARAINFLUENZA 1 PCR:: NEGATIVE
PARAINFLUENZA 2 PCR:: NEGATIVE
PARAINFLUENZA 3 PCR:: NEGATIVE
PARAINFLUENZA 4 PCR:: NEGATIVE
PLATELET # BLD AUTO: 246 10(3)UL (ref 150–450)
POTASSIUM SERPL-SCNC: 4.3 MMOL/L (ref 3.5–5.1)
PROCALCITONIN SERPL-MCNC: <0.11 NG/ML
RBC # BLD AUTO: 3.92 X10(6)UL (ref 3.8–5.3)
RHINOVIRUS/ENTERO PCR:: NEGATIVE
RSV RNA SPEC QL NAA+PROBE: NEGATIVE
SODIUM SERPL-SCNC: 136 MMOL/L (ref 136–145)
WBC # BLD AUTO: 7.7 X10(3) UL (ref 4–11)

## 2019-03-02 PROCEDURE — 99225 SUBSEQUENT OBSERVATION CARE: CPT | Performed by: STUDENT IN AN ORGANIZED HEALTH CARE EDUCATION/TRAINING PROGRAM

## 2019-03-02 RX ORDER — POLYVINYL ALCOHOL 14 MG/ML
1 SOLUTION/ DROPS OPHTHALMIC 4 TIMES DAILY PRN
Status: DISCONTINUED | OUTPATIENT
Start: 2019-03-02 | End: 2019-03-03

## 2019-03-02 RX ORDER — SENNOSIDES 8.6 MG
8.6 TABLET ORAL 2 TIMES DAILY
COMMUNITY

## 2019-03-02 RX ORDER — CETIRIZINE HYDROCHLORIDE 10 MG/1
10 TABLET ORAL DAILY
Status: DISCONTINUED | OUTPATIENT
Start: 2019-03-02 | End: 2019-03-03

## 2019-03-02 RX ORDER — MINERAL OIL/PETROLATUM,WHITE
CREAM (GRAM) TOPICAL AS NEEDED
Status: DISCONTINUED | OUTPATIENT
Start: 2019-03-02 | End: 2019-03-03

## 2019-03-02 RX ORDER — SENNOSIDES 8.6 MG
8.6 TABLET ORAL 2 TIMES DAILY
Status: DISCONTINUED | OUTPATIENT
Start: 2019-03-02 | End: 2019-03-03

## 2019-03-02 RX ORDER — KETOROLAC TROMETHAMINE 10 MG/1
10 TABLET, FILM COATED ORAL EVERY 4 HOURS PRN
COMMUNITY

## 2019-03-02 RX ORDER — MULTIVITAMIN
1 TABLET ORAL DAILY
COMMUNITY

## 2019-03-02 RX ORDER — IPRATROPIUM BROMIDE AND ALBUTEROL SULFATE 2.5; .5 MG/3ML; MG/3ML
3 SOLUTION RESPIRATORY (INHALATION) EVERY 6 HOURS PRN
Status: DISCONTINUED | OUTPATIENT
Start: 2019-03-02 | End: 2019-03-03

## 2019-03-02 RX ORDER — DIPHENHYDRAMINE HCL 25 MG
25 CAPSULE ORAL EVERY 6 HOURS PRN
Status: DISCONTINUED | OUTPATIENT
Start: 2019-03-02 | End: 2019-03-03

## 2019-03-02 RX ORDER — METHYLPREDNISOLONE SODIUM SUCCINATE 40 MG/ML
40 INJECTION, POWDER, LYOPHILIZED, FOR SOLUTION INTRAMUSCULAR; INTRAVENOUS EVERY 8 HOURS
Status: DISCONTINUED | OUTPATIENT
Start: 2019-03-02 | End: 2019-03-02

## 2019-03-02 RX ORDER — PROCHLORPERAZINE MALEATE 5 MG/1
10 TABLET ORAL 4 TIMES DAILY PRN
COMMUNITY

## 2019-03-02 RX ORDER — METHYLPREDNISOLONE SODIUM SUCCINATE 40 MG/ML
40 INJECTION, POWDER, LYOPHILIZED, FOR SOLUTION INTRAMUSCULAR; INTRAVENOUS EVERY 12 HOURS
Status: DISCONTINUED | OUTPATIENT
Start: 2019-03-03 | End: 2019-03-03

## 2019-03-02 NOTE — H&P
[facility]PITAL[facility]  History and Physical     Anna Maureenmarybel Patient Status:  Emergency    1935 MRN [id number]   Location [address] [place] [address] Attending Roark Lundborg, MD   Hosp Day # 0 PCP Franklin Oconnell MD     Chief Complaint: 1/30/2017    Performed by Rachael Waldrop MD at [facility]   • CERVICAL FACET INJECTION Left 1/23/2017    Performed by Rachael Waldrop MD at [facility] MAIN OR   • CHOLECYSTECTOMY  11/29/2011    Laparoscopic by Dr. Yeimy Su at [place] [address] • UPPER GI ENDOSCOPY,BIOPSY  2/18/16= Normal, mild duodenal villous blunting, positive celiac antibodies       Social History:  reports that  has never smoked.  she has never used smokeless tobacco. She reports that she does not drink alcohol or use drugs tablet Rfl: 0   Pravastatin Sodium 40 MG Oral Tab Take 1 tablet (40 mg total) by mouth once daily.  Disp: 90 tablet Rfl: 0   RaNITidine HCl 150 MG Oral Cap TAKE ONE CAPSULE BY MOUTH TWO TIMES DAILY Disp: 60 capsule Rfl: 11   Risedronate Sodium 150 MG Oral T results for input(s): PTP, INR in the last 168 hours. No results for input(s): TROP, CK in the last 168 hours. Imaging: Imaging data reviewed in Epic. ASSESSMENT / PLAN:     1. Bronchitis v PNA  1.  Empiric Merrem as not improving on cipro outpat

## 2019-03-02 NOTE — ED PROVIDER NOTES
Patient Seen in: THE [facility] Emergency Department In [place]    History   Patient presents with:  Cough/URI    Stated Complaint: cough    HPI    Patient is an 12-year-old female, remote history of breast cancer, history of colon cancer that is not being ac MD BARBER at [facility] MAIN OR   • CHOLECYSTECTOMY  11/29/2011    Laparoscopic by Dr. gilmore at [facility]   • COLONOSCOPY  2011    Benjamin/polyp/diverticulosis   • COLONOSCOPY  6/13/17= Diverticulosis, Hemorrhoids    Repeat PRN   • COLONOSCOPY N/A 6/13/2017    Pe Tobacco Use      Smoking status: Never Smoker      Smokeless tobacco: Never Used    Alcohol use: No      Alcohol/week: 0.0 oz    Drug use: No      Review of Systems    Positive for stated complaint: cough  Other systems are as noted in HPI.   Constitutional within normal limits   CBC WITH DIFFERENTIAL WITH PLATELET    Narrative: The following orders were created for panel order CBC WITH DIFFERENTIAL WITH PLATELET.   Procedure                               Abnormality         Status                     ----

## 2019-03-02 NOTE — PROGRESS NOTES
[facility]  Progress Note     [facility] Status:  [facility]    1935 MRN [id number]   [facility] 5NW-A Attending Mariana Edgar MD   Hosp Day # 0 PCP Yolanda Luciano MD     Chief Complaint: cough    S: Patient  Sta • atorvastatin  10 mg Oral Nightly   • famoTIDine  20 mg Oral BID   • enoxaparin  40 mg Subcutaneous Daily   • Fluticasone Propionate  1 spray Each Nare Daily       ASSESSMENT / PLAN:     1. Cough like bronchitis  2. H/o seizure d/o  3.  H/o hypothyroid

## 2019-03-02 NOTE — SLP NOTE
ADULT SWALLOWING EVALUATION    ASSESSMENT    ASSESSMENT/OVERALL IMPRESSION:  The patient was seen this am for a bedside swallow evaluation per MD orders to assess swallow function, determine safest/least restrictive means of nutrition and provide patient/f 7/29/2011   • Disorder of thyroid    • GERD 2011 EGD 2016   • HIGH CHOLESTEROL    • History of blood transfusion     50 years ago after childbirth   • Hyperlipidemia 5/23/2011   • Hypothyroidism 5/23/2011   • Lymphedema of arm 5/23/2011   • Myofascial pain esophageal involvement                GOALS  Goal #1 The patient will tolerate regular consistency and thin liquids without overt signs or symptoms of aspiration with 95 % accuracy over 1 session(s).   New goal   Goal #2 The patient/family/caregiver will de

## 2019-03-02 NOTE — PLAN OF CARE
Impaired Functional Mobility    • Achieve highest/safest level of mobility/gait Progressing        Impaired Swallowing    • Minimize aspiration risk Progressing          Problem: Acute bronchospasm    Data: Pt alert and oriented. Forgetful.  Maintaining O2

## 2019-03-02 NOTE — PROGRESS NOTES
NURSING ADMISSION NOTE      Patient admitted via Ambulance  Oriented to room 523. Safety precautions initiated. Bed in low position. Call light in reach. Admission completed with patient and daughter at bedside. Pt from [facility].  CACHORRO

## 2019-03-02 NOTE — ED INITIAL ASSESSMENT (HPI)
Pt is in Hospice and has been coughing for 3 weeks. Has been taking Cipro, Nebulizers but still constant coughing.

## 2019-03-02 NOTE — PHYSICAL THERAPY NOTE
PHYSICAL THERAPY QUICK EVALUATION - INPATIENT    Room Number: 523/523-A  Evaluation Date: 3/2/2019  Presenting Problem: SOB  Physician Order: PT Eval and Treat     Pt was admitted from [facility] on 3/1/2019 with SOB, extended period of time with cou INJECTION Left 1/23/2017    Performed by Bradly Adhikari MD at [facility] MAIN OR   • CHOLECYSTECTOMY  11/29/2011    Laparoscopic by Dr. gilmore at [facility]   • COLONOSCOPY  2011    Benjamin/polyp/diverticulosis   • COLONOSCOPY  6/13/17= Diverticulosis, Hemor positive celiac antibodies       HOME SITUATION  Type of Home: Other (Comment)([facility])   Home Layout: One level                Lives With: Alone(staff present on site)  Drives: No  Patient Owned Equipment: Other (Comment)       Pr walker  Pattern: Within Functional Limits          Skilled Therapy Provided: Pt received supine in bed, agreeable to PT. Pt demos supine to sit ind. Sit to/from stand with supervision to RW. Ambulation x250ft in hallway with RW and supervision.   Pt kecias

## 2019-03-02 NOTE — DIETARY NOTE
[address] diagnosis:  Acute bronchospasm [J98.01]    Ht:  5'2\"  Wt: 72.8 kg (160 lb 8 oz). BMI: Body mass index is 28.69 kg/m².   Wt Readings from Last 6 Encounters:  03/01/19 : 72.8 kg (160 lb

## 2019-03-02 NOTE — CM/SW NOTE
SANGEETA was paged by RN as the patient was brought to the hospital from [facility] where she was reportedly under [facility]. Staff asking for clarification on admission. SANGEETA contacted [facility] and currently waiting on return call.   SANGEETA also

## 2019-03-03 VITALS
DIASTOLIC BLOOD PRESSURE: 62 MMHG | SYSTOLIC BLOOD PRESSURE: 123 MMHG | OXYGEN SATURATION: 98 % | TEMPERATURE: 98 F | RESPIRATION RATE: 20 BRPM | WEIGHT: 169.31 LBS | BODY MASS INDEX: 30 KG/M2 | HEART RATE: 73 BPM

## 2019-03-03 PROCEDURE — 99217 OBSERVATION CARE DISCHARGE: CPT | Performed by: STUDENT IN AN ORGANIZED HEALTH CARE EDUCATION/TRAINING PROGRAM

## 2019-03-03 RX ORDER — PREDNISONE 20 MG/1
TABLET ORAL
Qty: 21 TABLET | Refills: 0 | Status: SHIPPED | OUTPATIENT
Start: 2019-03-03

## 2019-03-03 RX ORDER — DIPHENHYDRAMINE HCL 25 MG
25 CAPSULE ORAL ONCE
Status: COMPLETED | OUTPATIENT
Start: 2019-03-03 | End: 2019-03-03

## 2019-03-03 RX ORDER — ALBUTEROL SULFATE 90 UG/1
1 AEROSOL, METERED RESPIRATORY (INHALATION) 3 TIMES DAILY
Qty: 1 INHALER | Refills: 0 | Status: SHIPPED | OUTPATIENT
Start: 2019-03-03 | End: 2019-03-10

## 2019-03-03 RX ORDER — FAMOTIDINE 20 MG/1
20 TABLET ORAL DAILY
Status: DISCONTINUED | OUTPATIENT
Start: 2019-03-04 | End: 2019-03-03

## 2019-03-03 RX ORDER — ALBUTEROL SULFATE 90 UG/1
1 AEROSOL, METERED RESPIRATORY (INHALATION) EVERY 6 HOURS PRN
Qty: 1 INHALER | Refills: 0 | Status: SHIPPED | OUTPATIENT
Start: 2019-03-03 | End: 2019-03-03

## 2019-03-03 RX ORDER — FLUTICASONE PROPIONATE 50 MCG
1 SPRAY, SUSPENSION (ML) NASAL 2 TIMES DAILY
Status: DISCONTINUED | OUTPATIENT
Start: 2019-03-03 | End: 2019-03-03

## 2019-03-03 RX ORDER — CETIRIZINE HYDROCHLORIDE 10 MG/1
10 TABLET ORAL DAILY
Qty: 5 TABLET | Refills: 0 | Status: SHIPPED | OUTPATIENT
Start: 2019-03-04 | End: 2019-03-09

## 2019-03-03 NOTE — PLAN OF CARE
GENITOURINARY - ADULT    • Absence of urinary retention Progressing        Impaired Functional Mobility    • Achieve highest/safest level of mobility/gait Progressing        Impaired Swallowing    • Minimize aspiration risk Progressing        NEUROLOGICAL

## 2019-03-03 NOTE — PROGRESS NOTES
NURSING DISCHARGE NOTE    Discharged Other, (see nursing note) via Wheelchair. Accompanied by Family member and Support staff  Belongings Taken by patient/family. IV discontinued. VSS.  Discharge instructions and prescriptions given to patient and h

## 2019-03-03 NOTE — PROGRESS NOTES
[facility] Pharmacy Note: Renal dose adjustment for Famotidine (Pepcid)  Pepe Enrique has been prescribed Famotidine (Pepcid) 20 mg every 12 hours.           CrCl estimated at 40.9 ml/min (based on minimum Scr =0.85 for age >74)    Her calculated creatinine cl

## 2019-03-03 NOTE — CM/SW NOTE
Call from RN that pt is discharged back to [facility] will transport. RN has already spoken with [facility] hospice nurse and provided requested info to resume hospice services. RN will f/u with dtr about notifying retirement that pt is returning.

## 2019-03-03 NOTE — PROGRESS NOTES
[facility]  Progress Note     Tamir Jordan Patient Status:  Observation    1935 MRN [id number]   [facility] 5NW-A Attending Jaclyn Davis MD   Hosp Day # 0 PCP Tony Yanez MD     Chief Complaint: cough    S: Patient afeb • Senna  8.6 mg Oral BID   • cetirizine  10 mg Oral Daily   • levETIRAcetam  500 mg Oral BID   • Levothyroxine Sodium  75 mcg Oral Before breakfast   • mirtazapine  15 mg Oral Nightly   • Montelukast Sodium  10 mg Oral Daily   • atorvastatin  10 mg Oral

## 2019-03-03 NOTE — SLP NOTE
SPEECH DAILY NOTE - INPATIENT    ASSESSMENT & PLAN   ASSESSMENT  Pt seen for meal assess/dysphagia therapy to monitor po tolerance of recommended diet and ensure adherence to aspiration precautions. Pt alert and upright in chair.   Pt observed self feeding

## 2019-03-04 NOTE — PROGRESS NOTES
Pio Bedoya, from [facility], called this nurse to ask for the transfer report for this pt. It was not sent with pt at discharge.  Janice, this nurse's clinical leader, was notified and this nurse asked her to print the AVS and Transfer report to [facility]

## 2019-03-04 NOTE — CM/SW NOTE
03/04/19 0900   Discharge disposition   Expected discharge disposition Hospice/Home   Name of Facillity/Home Care/Hospice ([facility])   Additional Home Care/Hospice Provider [facility])   Discharge transportation Private car     Patient disch

## 2019-03-06 NOTE — DISCHARGE SUMMARY
[facility]  DISCHARGE SUMMARY     Mirta Flood Patient Status:  Observation    1935 MRN [id number]   [facility] 5NW-A Attending Venkat Juarez MD   Hosp Day # 0 PCP Kate Ortiz MD     Date of Admission: 3/1/2019  Date of results pending at Discharge:   · no    Consultants:  • no    Discharge Medication List:     Discharge Medications      START taking these medications      Instructions Prescription details   Albuterol Sulfate  (90 Base) MCG/ACT Aers      Inhale 1 p Meclizine HCl 25 MG Tabs  Commonly known as:  ANTIVERT      Take 1 tablet (25 mg total) by mouth 3 (three) times daily as needed.    Quantity:  60 tablet  Refills:  0     mirtazapine 15 MG Tabs  Commonly known as:  REMERON      Take 1 tablet (15 mg total) None          Vital signs:       Physical Exam:    General: No acute distress. Respiratory: Clear to auscultation bilaterally. No wheezes. No rhonchi. Cardiovascular: S1, S2. Regular rate and rhythm. No murmurs, rubs or gallops.    Abdomen: Soft, nontend

## (undated) DEVICE — FILTERLINE NASAL ADULT O2/CO2

## (undated) DEVICE — ENDOSCOPY PACK - LOWER: Brand: MEDLINE INDUSTRIES, INC.

## (undated) DEVICE — 3M™ RED DOT™ MONITORING ELECTRODE WITH FOAM TAPE AND STICKY GEL, 50/BAG, 20/CASE, 72/PLT 2570: Brand: RED DOT™

## (undated) DEVICE — 1200CC GUARDIAN II: Brand: GUARDIAN

## (undated) DEVICE — Device: Brand: DEFENDO AIR/WATER/SUCTION AND BIOPSY VALVE

## (undated) NOTE — IP AVS SNAPSHOT
[address]            (For Outpatient Use Only) Initial Admit Date: 3/1/2019   Inpt/Obs Admit Date: Inpt: N/A / Obs: 03/01/19   Discharge Date:    Hospital Acct:  [de-identified]   MRN: [de-identified]   CSN: [id number]        ENCOUNTER  Patient Subscriber ID:  Pt Rel to Subscriber:    Hospital Account Financial Class: Medicare    March 3, 2019

## (undated) NOTE — IP AVS SNAPSHOT
[facility] [place] [address] [facility], [address] ~ [phone]                Discharge Summary   6/13/2017    Tamir Jordan           Admission Information        Provider Department    6/13/2017 Austin Haas MD  Endoscopy Melinda Graves     [    ]    [    ]    [    ]    [    ]       Vitamin D3 1000 units Tabs   Commonly known as:  VITAMIN D3        Take 1,000 Units by mouth 2 (two) times daily.       [    ]    [    ]    [    ]    [    ]                 Patient Instructions Instructions and Information about Your Health     None      Follow-up Information     Call Dheeraj Davey MD.    Specialty:  GASTROENTEROLOGY    Why:  As needed    Contact information:    [address] (L) (03/16/17)  106      Radiology Exams     None         Additional Information       We are concerned for your overall well being:    - If you are a smoker or have smoked in the last 12 months, we encourage you to explore options for quitting.     -

## (undated) NOTE — MR AVS SNAPSHOT
[facility], [address], [address] [place], [place] [id number]               Thank you for choosing us for your health care visit with ANTOINETTE Marcelino.   We are glad to serve you and happy to provide you with this Refill policies:    ? Allow 2 business days for refills; controlled substances may take longer. ?  Contact your pharmacy at least 5 days prior to running out of medication and have them send an electronic request or submit request through the [facility] insurance carrier to obtain pre-certification or prior authorization. Unfortunately, [facility] has seen an increase in denial of payment even though the procedure/test has been pre-certified.   You are strongly encouraged to contact your insurance carrier to v Mix 50:50 with ketoconazole cream and apply thin layer to facial wound with every bandage change   Commonly known as:  BACTROBAN           Pravastatin Sodium 40 MG Tabs   Take 1 tablet (40 mg total) by mouth once daily.    Commonly known as:  PRAVACHOL Don’t forget strength training with weights and resistance Set goals and track your progress   You don’t need to join a gym. Home exercises work great.  Put more priority on exercise in your life                    Visit [facility] online at

## (undated) NOTE — IP AVS SNAPSHOT
Patient Demographics     Address  [address] [place] [address] Phone  [phone] [facility])  [phone] (Mobile) *Preferred* E-mail Address  [email or web address]      Emergency Contact(s)     Name Relation Home Work Daniela Crum Take 10 mg by mouth every 4 (four) hours as needed for Pain.          levETIRAcetam 500 MG Tabs  Commonly known as:  KEPPRA  Next dose due: Tonight      Take 1 tablet (500 mg total) by mouth 2 (two) times daily.    Camilla Nielson MD         Levothyroxine S Take 1 tablet (150 mg total) by mouth every 30 (thirty) days. Kate Ortiz MD         Senna 8.6 MG Tabs  Commonly known as:  SENOKOT  Next dose due: Tonight      Take 8.6 mg by mouth 2 (two) times daily.           Vitamin D3 1000 units Tabs  Commonly k Coronavirus Hku1 PCR: Negative     Coronavirus Nl63 PCR: Negative     Coronavirus Oc43 PCR: Negative     Metapneumovirus PCR: Negative     Rhinovirus/Entero PCR: Negative     Influenza A PCR: Negative     Influenza B PCR: Negative     Parainfluenza 1 PCR • Colon cancer ([facility] [address] 2/16 Stage II    breast cancer and skin cancer as well   • Colon polyp     Dr Junito Valentin   • Degeneration of cervical intervertebral disc 7/29/2011   • Disorder of thyroid    • GERD 2011 EGD 2016   • HIGH CHOLESTEROL    • History of blood • INSERTION PORT-A-CATHETER N/A 10/23/2017    Performed by Dipika Bey MD at [place] [address] - RIGHT N/A 2/29/2016    Performed by Mg Bermeo MD at [facility] MAIN OR   • LUMBAR FACET INJECTION Bilateral 1/5/2016 No current facility-administered medications on file prior to encounter. Current Outpatient Medications on File Prior to Encounter:  Ciprofloxacin HCl 250 MG Oral Tab Take by mouth 2 (two) times daily.  Disp:  Rfl:    benzonatate 100 MG Oral Cap Take 100 /61   Pulse 90   Temp 98.3 °F (36.8 °C) (Temporal)   Resp 24   Wt 152 lb 1.9 oz (69 kg)   SpO2 96%   BMI 27.19 kg/m²   General: No acute distress. Alert and oriented x 3. HEENT: Normocephalic atraumatic. Moist mucous membranes.    Neck: No carotid br Fiorella Blair DO  3/1/2019[NG.1]                        Electronically signed by Ten Anderson DO on 3/2/2019 12:07 AM   Attribution Key    NG. 1 - Ten Anderson DO on 3/1/2019 11:14 PM  NG. 2 - Ten Anderson DO on 3/1/2019 11:59 PM  NG. 3 - Prescott Hora, 50 years ago after childbirth   • Hyperlipidemia 5/23/2011   • Hypothyroidism 5/23/2011   • Lymphedema of arm 5/23/2011   • Myofascial pain 7/29/2011   • Obstructive sleep apnea syndrome PSG 9-27-15    AHI 8 RDI 10 REM AHI 16 SaO2 eden 74 %   • Osteoarth • LUMBAR INTERLAMINAR EPIDURAL INJECTION N/A 1/12/2016    Performed by Tianna Lacey MD at [facility] MAIN OR   • Eryn [address] 93    radical   • OTHER SURGICAL HISTORY  1980    cyst from foot   • OTHER SURGICAL HISTORY  2009    dental implant   • [facility] Ipratropium-Albuterol (DUONEB IN) Inhale into the lungs. Disp:  Rfl:    Potassium Chloride ER 10 MEQ Oral Tab CR Take 2 tablets (20 mEq total) by mouth 2 (two) times daily.  Disp: 120 tablet Rfl: 1   levETIRAcetam 500 MG Oral Tab Take 1 tablet (500 mg total Cardiovascular: S1, S2. Regular rate and rhythm. No murmurs, rubs or gallops. Equal pulses. Chest and Back: No tenderness or deformity. Abdomen: Soft, nontender, nondistended. Positive bowel sounds. No rebound, guarding or organomegaly.   Neurologic: No Physical Therapy Notes (last 72 hours) (Notes from 3/1/2019  2:24 PM through 3/4/2019  2:24 PM)      Physical Therapy Note signed by Jann Combs PT at 3/2/2019  9:38 AM  Version 2 of 2    Author:  Jann Combs PT Service:  Rehab Author Type:  Phys • Osteopenia 6/23/2011   • Ringing in ears     bilateral   • Seizure disorder [facility])    • Thyroid nodule 5/23/2011    partial thyroidectomy   • TMJ (temporomandibular joint disorder) 11/17/2011   • Unspecified vitamin D deficiency 5/23/2011   • Visual impai dental implant   • SACROILIAC JOINT INJECTION BILATERAL Bilateral 12/28/2015    Performed by Latasha Heath MD at [facility] MAIN OR   • SKIN SURGERY  3-1-17    Left sup antihelix, Mohs with Dr. Huffman, [facility]   • THYROIDECTOMY  2006    rt side   • TONSILLECTOMY -   Moving from lying on back to sitting on the side of the bed?: None   How much help from another person does the patient currently need. ..   -   Moving to and from a bed to a chair (including a wheelchair)?: None   -   Need to walk in hospital room?: No and will continue with encouraged ambulation to maintain current level of mobility.    The rehab aide will perform treatment activities prescribed by this physical therapist. The rehab aide will communicate with overseeing PT regarding any change in functio • Colon cancer ([facility] [address] 2/16 Stage II    breast cancer and skin cancer as well   • Colon polyp     Dr Junito Valentin   • Degeneration of cervical intervertebral disc 7/29/2011   • Disorder of thyroid    • GERD 2011 EGD 2016   • HIGH CHOLESTEROL    • History of blood • INSERTION PORT-A-CATHETER N/A 10/23/2017    Performed by Dipika Bey MD at [place] [address] - RIGHT N/A 2/29/2016    Performed by Mg Bermeo MD at [facility] MAIN OR   • LUMBAR FACET INJECTION Bilateral 1/5/2016 Lower extremity ROM is within functional limits     Lower extremity strength is within functional limits     NEUROLOGICAL FINDINGS                      ACTIVITY TOLERANCE                         O2 WALK                  AM-PAC '6-Clicks' INPATIENT SHORT FO Short Form was completed and this patient is demonstrating a 11.2% degree of impairment in mobility. Research supports that patients with this level of impairment may benefit from return to home.   .  Based on this evaluation, patient's clinical presentatio verbalized and pt able to return demonstrate. No additional ST warranted at this time as pt tolerating LRD with no csa and adheres to aspiration precautions independently. Diet Recommendations - Solids: Regular  Diet Recommendations - Liquid:  Thin    C Tb Intradermal Test 02/27/18